# Patient Record
Sex: FEMALE | Race: WHITE | NOT HISPANIC OR LATINO | Employment: UNEMPLOYED | URBAN - METROPOLITAN AREA
[De-identification: names, ages, dates, MRNs, and addresses within clinical notes are randomized per-mention and may not be internally consistent; named-entity substitution may affect disease eponyms.]

---

## 2019-01-31 ENCOUNTER — TELEPHONE (OUTPATIENT)
Dept: FAMILY MEDICINE | Facility: CLINIC | Age: 59
End: 2019-01-31

## 2019-01-31 NOTE — TELEPHONE ENCOUNTER
Attempted to reach pt regarding appointment scheduled with Dalila Mercado NP tomorrow, February 1st. She is attempting to establish care with this provider but her primary insurance is listed as Medicaid. Attempting to let her know that Dalila is not accepting New Medicaid patients at this time and give her the Medicaid Escalation Line number: [phone].    LM for pt to return call to clinic. Callback number provided on voicemail message.

## 2019-02-03 ENCOUNTER — TELEPHONE (OUTPATIENT)
Dept: FAMILY MEDICINE | Facility: CLINIC | Age: 59
End: 2019-02-03

## 2019-02-03 NOTE — TELEPHONE ENCOUNTER
Brianna, please contact this patient again and let her know that I am not taking new medicaid patients and I will not see her if she shows up here in the clinic.